# Patient Record
Sex: FEMALE | Race: WHITE | Employment: STUDENT
[De-identification: names, ages, dates, MRNs, and addresses within clinical notes are randomized per-mention and may not be internally consistent; named-entity substitution may affect disease eponyms.]

---

## 2018-11-27 ENCOUNTER — HEALTH MAINTENANCE LETTER (OUTPATIENT)
Age: 23
End: 2018-11-27

## 2021-05-14 ENCOUNTER — MEDICAL CORRESPONDENCE (OUTPATIENT)
Dept: HEALTH INFORMATION MANAGEMENT | Facility: CLINIC | Age: 26
End: 2021-05-14

## 2021-05-17 ENCOUNTER — TELEPHONE (OUTPATIENT)
Dept: SURGERY | Facility: CLINIC | Age: 26
End: 2021-05-17

## 2021-05-17 NOTE — TELEPHONE ENCOUNTER
[facility] received a referral for patient to see  for a possible Sports Hernia. Scheduling imaging.    5/18/21- to call to schedule-appt. Should be after imaging, also to check ins. And sports hernia disclaimer. cag     5/20/21 Tried calling, no answer,  Unable to leave . cag    5/23/21  to call and schedule. cag

## 2021-05-23 ENCOUNTER — RECORDS - HEALTHEAST (OUTPATIENT)
Dept: LAB | Facility: CLINIC | Age: 26
End: 2021-05-23

## 2021-05-23 LAB
SARS-COV-2 PCR COMMENT: NORMAL
SARS-COV-2 RNA SPEC QL NAA+PROBE: NEGATIVE
SARS-COV-2 VIRUS SPECIMEN SOURCE: NORMAL

## 2021-05-26 ENCOUNTER — RECORDS - HEALTHEAST (OUTPATIENT)
Dept: LAB | Facility: CLINIC | Age: 26
End: 2021-05-26

## 2025-01-20 ENCOUNTER — HOSPITAL ENCOUNTER (EMERGENCY)
Facility: CLINIC | Age: 30
Discharge: HOME | End: 2025-01-20
Attending: EMERGENCY MEDICINE | Admitting: EMERGENCY MEDICINE

## 2025-01-20 VITALS
DIASTOLIC BLOOD PRESSURE: 85 MMHG | RESPIRATION RATE: 22 BRPM | OXYGEN SATURATION: 100 % | TEMPERATURE: 98.4 F | HEART RATE: 91 BPM | SYSTOLIC BLOOD PRESSURE: 138 MMHG

## 2025-01-20 DIAGNOSIS — J10.1 INFLUENZA B: ICD-10-CM

## 2025-01-20 LAB
FLUAV RNA SPEC QL NAA+PROBE: NEGATIVE
FLUBV RNA RESP QL NAA+PROBE: POSITIVE
RSV RNA SPEC NAA+PROBE: NEGATIVE
SARS-COV-2 RNA RESP QL NAA+PROBE: NEGATIVE

## 2025-01-20 PROCEDURE — 99283 EMERGENCY DEPT VISIT LOW MDM: CPT

## 2025-01-20 PROCEDURE — 87637 SARSCOV2&INF A&B&RSV AMP PRB: CPT | Performed by: EMERGENCY MEDICINE

## 2025-01-20 ASSESSMENT — COLUMBIA-SUICIDE SEVERITY RATING SCALE - C-SSRS
2. HAVE YOU ACTUALLY HAD ANY THOUGHTS OF KILLING YOURSELF IN THE PAST MONTH?: NO
6. HAVE YOU EVER DONE ANYTHING, STARTED TO DO ANYTHING, OR PREPARED TO DO ANYTHING TO END YOUR LIFE?: NO
1. IN THE PAST MONTH, HAVE YOU WISHED YOU WERE DEAD OR WISHED YOU COULD GO TO SLEEP AND NOT WAKE UP?: NO

## 2025-01-20 ASSESSMENT — ACTIVITIES OF DAILY LIVING (ADL)
ADLS_ACUITY_SCORE: 41
ADLS_ACUITY_SCORE: 41

## 2025-01-20 NOTE — LETTER
January 20, 2025      To Whom It May Concern:      Rosmery Hillman was seen in our Emergency Department today, 01/20/25.  I expect her condition to improve over the next 2 days.  She may return to work when improved.    Sincerely,        Aruna LUU RN  Electronically signed

## 2025-01-21 NOTE — ED TRIAGE NOTES
Cough, head, body aches and fevers of 102.4 for 3 days. No meds taken PTA. Worried that she might have pneumonia.       Triage Assessment (Adult)       Row Name 01/20/25 2011          Triage Assessment    Airway WDL WDL        Respiratory WDL    Respiratory WDL X;cough     Cough Frequency infrequent     Cough Type dry        Skin Circulation/Temperature WDL    Skin Circulation/Temperature WDL WDL        Cardiac WDL    Cardiac WDL WDL        Peripheral/Neurovascular WDL    Peripheral Neurovascular WDL WDL        Cognitive/Neuro/Behavioral WDL    Cognitive/Neuro/Behavioral WDL WDL

## 2025-01-21 NOTE — ED PROVIDER NOTES
Emergency Department Note      History of Present Illness     Chief Complaint   Cough      HPI   Rosmery Hillman is a 29 year old female presents for influenza-like illness.  Patient reports over the last 6 days she has had bodyaches, chills, fevers, dry cough, and diarrhea (diarrhea now resolved).  Patient reports no significant medical history.  No concern for pregnancy.  Does not take any prescribed medications have been using Sudafed for symptom relief.    Independent Historian   None    Review of External Notes   N/a    Past Medical History     Medical History and Problem List   No reported past medical history    Medications   NO ACTIVE MEDICATIONS      Surgical History   No past surgical history on file.    Physical Exam     Patient Vitals for the past 24 hrs:   BP Temp Temp src Pulse Resp SpO2   01/20/25 2010 138/85 98.4  F (36.9  C) Oral 91 22 100 %     Physical Exam  General: Alert and cooperative with exam. Patient in mild to moderate distress. Normal mentation.  Ill but nontoxic appearance  Head:  Scalp is NC/AT  Eyes:  No scleral icterus, PERRL  ENT:  The external nose and ears are normal. The oropharynx is normal and without erythema; mucus membranes are moist. Uvula midline, no evidence of deep space infection.  Neck:  Normal range of motion without rigidity.  CV:  Regular rate and rhythm    No pathologic murmur   Resp:  Breath sounds are clear bilaterally.  Moderate cough with occasional coughing fits    Non-labored, no retractions or accessory muscle use  GI:  Abdomen is soft, no distension, no tenderness. No peritoneal signs  MS:  No lower extremity edema   Skin:  Warm and dry, No rash or lesions noted.  Neuro: Oriented x 3. No gross motor deficits.     Diagnostics     Lab Results   Labs Ordered and Resulted from Time of ED Arrival to Time of ED Departure   INFLUENZA A/B, RSV AND SARS-COV2 PCR - Abnormal       Result Value    Influenza A PCR Negative      Influenza B PCR Positive (*)     RSV PCR  Negative      SARS CoV2 PCR Negative         Imaging   No orders to display       Independent Interpretation   None    ED Course      Medications Administered   Medications - No data to display    Procedures   Procedures     Discussion of Management   None    ED Course        Additional Documentation  None    Medical Decision Making / Diagnosis     CMS Diagnoses: None    MIPS       None    MDM   Rosmery Hillman is a 29 year old female who presents for evaluation of cough, fever and myalgias. This is consistent with influenza; confirmed on testing.   The patient is out of treatment window for influenza.  They are at risk for pneumonia but no signs of this are detected on today's visit.  Lungs are clear to auscultation.  Work of breathing and oxygen saturations are normal.  Discussed continued supportive care and return precautions.  Patient discharged home.    Disposition   The patient was discharged.     Diagnosis     ICD-10-CM    1. Influenza B  J10.1            Discharge Medications   New Prescriptions    No medications on file              Brad Engle,   01/20/25 7609

## 2025-06-16 VITALS
SYSTOLIC BLOOD PRESSURE: 124 MMHG | TEMPERATURE: 99 F | HEART RATE: 104 BPM | OXYGEN SATURATION: 99 % | DIASTOLIC BLOOD PRESSURE: 85 MMHG | RESPIRATION RATE: 16 BRPM | HEIGHT: 67 IN | BODY MASS INDEX: 25.63 KG/M2 | WEIGHT: 163.3 LBS

## 2025-06-16 PROCEDURE — 99283 EMERGENCY DEPT VISIT LOW MDM: CPT | Performed by: EMERGENCY MEDICINE

## 2025-06-16 PROCEDURE — 99283 EMERGENCY DEPT VISIT LOW MDM: CPT | Mod: 25 | Performed by: EMERGENCY MEDICINE

## 2025-06-16 ASSESSMENT — COLUMBIA-SUICIDE SEVERITY RATING SCALE - C-SSRS
2. HAVE YOU ACTUALLY HAD ANY THOUGHTS OF KILLING YOURSELF IN THE PAST MONTH?: NO
1. IN THE PAST MONTH, HAVE YOU WISHED YOU WERE DEAD OR WISHED YOU COULD GO TO SLEEP AND NOT WAKE UP?: NO
6. HAVE YOU EVER DONE ANYTHING, STARTED TO DO ANYTHING, OR PREPARED TO DO ANYTHING TO END YOUR LIFE?: NO

## 2025-06-17 ENCOUNTER — HOSPITAL ENCOUNTER (EMERGENCY)
Facility: CLINIC | Age: 30
Discharge: HOME | End: 2025-06-17
Attending: EMERGENCY MEDICINE | Admitting: EMERGENCY MEDICINE
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
## 2025-06-17 LAB
HCG UR QL: NEGATIVE
INTERNAL QC OK POCT: NORMAL
POCT KIT EXPIRATION DATE: NORMAL
POCT KIT LOT NUMBER: NORMAL

## 2025-06-17 PROCEDURE — 90471 IMMUNIZATION ADMIN: CPT | Performed by: EMERGENCY MEDICINE

## 2025-06-17 PROCEDURE — 90715 TDAP VACCINE 7 YRS/> IM: CPT | Performed by: EMERGENCY MEDICINE

## 2025-06-17 PROCEDURE — 250N000011 HC RX IP 250 OP 636: Performed by: EMERGENCY MEDICINE

## 2025-06-17 PROCEDURE — 81025 URINE PREGNANCY TEST: CPT | Performed by: EMERGENCY MEDICINE

## 2025-06-17 PROCEDURE — 250N000013 HC RX MED GY IP 250 OP 250 PS 637: Performed by: EMERGENCY MEDICINE

## 2025-06-17 RX ADMIN — AMOXICILLIN AND CLAVULANATE POTASSIUM 1 TABLET: 875; 125 TABLET, FILM COATED ORAL at 01:38

## 2025-06-17 RX ADMIN — CLOSTRIDIUM TETANI TOXOID ANTIGEN (FORMALDEHYDE INACTIVATED), CORYNEBACTERIUM DIPHTHERIAE TOXOID ANTIGEN (FORMALDEHYDE INACTIVATED), BORDETELLA PERTUSSIS TOXOID ANTIGEN (GLUTARALDEHYDE INACTIVATED), BORDETELLA PERTUSSIS FILAMENTOUS HEMAGGLUTININ ANTIGEN (FORMALDEHYDE INACTIVATED), BORDETELLA PERTUSSIS PERTACTIN ANTIGEN, AND BORDETELLA PERTUSSIS FIMBRIAE 2/3 ANTIGEN 0.5 ML: 5; 2; 2.5; 5; 3; 5 INJECTION, SUSPENSION INTRAMUSCULAR at 01:38

## 2025-06-17 ASSESSMENT — ACTIVITIES OF DAILY LIVING (ADL): ADLS_ACUITY_SCORE: 41

## 2025-06-17 NOTE — ED PROVIDER NOTES
"ED Provider Note  [facility] EMERGENCY DEPARTMENT ([facility])    6/17/25       ED PROVIDER NOTE     History     Chief Complaint   Patient presents with    Dog Bite     HPI  Rosmery Hillman is a 29 year old female who presents to the ED for evaluation of a dog bite. 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 The owners dogs reported that the dog is up to date on vaccines. It does hurt to put weight on her leg.     Per chart review: The patients last Tdap 2012.     Past Medical History  No past medical history on file.  No past surgical history on file.  amoxicillin-clavulanate (AUGMENTIN) 875-125 MG tablet  NO ACTIVE MEDICATIONS      No Known Allergies  Family History  No family history on file.  Social History   Social History     Tobacco Use    Smoking status: Never   Substance Use Topics    Alcohol use: No    Drug use: No      A medically appropriate review of systems was performed with pertinent positives and negatives noted in the HPI, and all other systems negative.    Physical Exam   BP: 124/85  Pulse: 104  Temp: 99  F (37.2  C)  Resp: 16  Height: 170.2 cm (5' 7\")  Weight: 74.1 kg (163 lb 4.8 oz)  SpO2: 99 %  Physical Exam  Vitals and nursing note reviewed.   Constitutional:       General: She is not in acute distress.     Appearance: Normal appearance.   HENT:      Head: Normocephalic.      Nose: Nose normal.   Eyes:      Pupils: Pupils are equal, round, and reactive to light.   Cardiovascular:      Rate and Rhythm: Normal rate and regular rhythm.   Pulmonary:      Effort: Pulmonary effort is normal.   Abdominal:      General: There is no distension.   Musculoskeletal:         General: No deformity. Normal range of motion.      Cervical back: Normal range of motion.        Legs:    Skin:     General: Skin is warm.   Neurological:      Mental Status: She is alert and oriented to person, place, and time. "   Psychiatric:         Mood and Affect: Mood normal.           ED Course, Procedures, & Data     ED Course as of 06/18/25 0754   Tue Jun 17, 2025   0106 Patient has small puncture wound just distal to the patella.  There is a hematoma medial aspect of the knee.  No focal bony tenderness.  Patient is able to bear weight.  Low suspicion for fracture/dislocation, ligamentous injury, foreign body, or other complication.  Tetanus was updated.  Patient was started on Augmentin.  Rabies prophylaxis was offered but declined by patient.  Will follow-up with PCP.  Educated reasons to return to the ED.          Results for orders placed or performed during the hospital encounter of 06/17/25   hCG qual urine POCT     Status: Normal   Result Value Ref Range    HCG Qual Urine Negative Negative    Internal QC Check POCT Valid Valid    POCT Kit Lot Number [id number]     POCT Kit Expiration Date 2026-08-12      Medications   amoxicillin-clavulanate (AUGMENTIN) 875-125 MG per tablet 1 tablet (1 tablet Oral $Given 6/17/25 0138)   Tdap (tetanus-diphtheria-acell pertussis) (ADACEL) injection 0.5 mL (0.5 mLs Intramuscular $Given 6/17/25 0138)     Labs Ordered and Resulted from Time of ED Arrival to Time of ED Departure   HCG QUALITATIVE URINE POCT - Normal       Result Value    HCG Qual Urine Negative      Internal QC Check POCT Valid      POCT Kit Lot Number [id number]      POCT Kit Expiration Date 2026-08-12       No orders to display          Critical care was not performed.     Medical Decision Making  The patient's presentation was of moderate complexity (an acute complicated injury).    The patient's evaluation involved:  history and exam without other MDM data elements    The patient's management necessitated moderate risk (prescription drug management including medications given in the ED).    Assessment & Plan    Patient presents the ED for evaluation of dog bite wound.  Dog reportedly up-to-date on vaccinations, however, that has not  been verified with paperwork.  She has a small puncture wound as noted in the physical exam.  No indication for repair.  Wound was cleansed extensively.  Tetanus updated.  Started on Augmentin.  First dose given in the ED.  I did offer rabies prophylaxis however, patient deferred at this time.    I have reviewed the nursing notes. I have reviewed the findings, diagnosis, plan and need for follow up with the patient.    Discharge Medication List as of 6/17/2025  1:18 AM        START taking these medications    Details   amoxicillin-clavulanate (AUGMENTIN) 875-125 MG tablet Take 1 tablet by mouth 2 times daily., Disp-14 tablet, R-0, E-Prescribe             Final diagnoses:   Dog bite, initial encounter   I, Sadie Talley, am serving as a trained medical scribe to document services personally performed by Jony Moore DO, based on the provider's statements to me.     IJony DO, was physically present and have reviewed and verified the accuracy of this note documented by Sadie Talley.     Jony Moore DO  [facility] EMERGENCY DEPARTMENT  6/16/2025     Jony Moore DO  06/18/25 0754

## 2025-06-17 NOTE — ED TRIAGE NOTES
Patient present to the ED with a dog bit to the R knee. A police report was made as it was not the patients dog, per the owner of the dog the patient had a rabies vaccine. Bleeding is controlled, but a puncture wound is present. VSS in triage.      Triage Assessment (Adult)       Row Name 06/16/25 5034          Triage Assessment    Airway WDL WDL        Respiratory WDL    Respiratory WDL WDL        Skin Circulation/Temperature WDL    Skin Circulation/Temperature WDL WDL        Cardiac WDL    Cardiac WDL X;rhythm     Pulse Rate & Regularity tachycardic        Peripheral/Neurovascular WDL    Peripheral Neurovascular WDL WDL        Cognitive/Neuro/Behavioral WDL    Cognitive/Neuro/Behavioral WDL WDL

## 2025-06-29 ENCOUNTER — HEALTH MAINTENANCE LETTER (OUTPATIENT)
Age: 30
End: 2025-06-29